# Patient Record
(demographics unavailable — no encounter records)

---

## 2025-01-30 NOTE — HEALTH RISK ASSESSMENT
[Good] : ~his/her~  mood as  good [Yes] : Yes [Monthly or less (1 pt)] : Monthly or less (1 point) [1 or 2 (0 pts)] : 1 or 2 (0 points) [Never (0 pts)] : Never (0 points) [No] : In the past 12 months have you used drugs other than those required for medical reasons? No [Current] : Current [5-9] : 5-9 [Several Days (1)] : 4.) Feeling tired or having little energy? Several days [1/2 of Days or More (2)] : 5.) Poor appetite or overeating? Half the days or more [Not at All (0)] : 9.) Thoughts that you would be off dead or of hurting yourself in some way? Not at all [Mild] : Severity of Depression is Mild [Not at all] : How difficult have these problems made it for you to do your work, take care of things at home, or get along with people? Not at all [PHQ-9 Positive] : PHQ-9 Positive [I have developed a follow-up plan documented below in the note.] : I have developed a follow-up plan documented below in the note. [[id number]] : 5 [Change in mental status noted] : No change in mental status noted

## 2025-01-30 NOTE — ASSESSMENT
[FreeTextEntry1] : depression: Controlled without medication. Denies SI. Gout: Check uric acid. Referred to [facility] rheum to discuss starting allopurinol due to multiple attacks within past 2 months.  ED: Check PSA, UA, testosterone. Discuss cialis 10mg daily PRN.  HLD: Check lipids. EKG shows sinus bradycardia.  Hcm: Due for colonoscopy 2027. Recommended 2nd shingrix when it is covered. Check labs.

## 2025-01-30 NOTE — REVIEW OF SYSTEMS
[Fever] : no fever [Chills] : no chills [Night Sweats] : no night sweats [Discharge] : no discharge [Vision Problems] : no vision problems [Itching] : no itching [Earache] : no earache [Nasal Discharge] : no nasal discharge [Sore Throat] : no sore throat [Chest Pain] : no chest pain [Palpitations] : no palpitations [Lower Ext Edema] : no lower extremity edema [Shortness Of Breath] : no shortness of breath [Wheezing] : no wheezing [Cough] : no cough [Dyspnea on Exertion] : not dyspnea on exertion [Abdominal Pain] : no abdominal pain [Nausea] : no nausea [Constipation] : no constipation [Diarrhea] : no diarrhea [Vomiting] : no vomiting [Melena] : no melena [Dysuria] : no dysuria [Joint Pain] : joint pain [Muscle Pain] : no muscle pain [Muscle Weakness] : no muscle weakness [Skin Rash] : no skin rash [Headache] : no headache [Dizziness] : no dizziness [Suicidal] : not suicidal [Anxiety] : no anxiety [Depression] : no depression [Easy Bleeding] : no easy bleeding [Easy Bruising] : no easy bruising [Swollen Glands] : no swollen glands

## 2025-01-30 NOTE — HISTORY OF PRESENT ILLNESS
[FreeTextEntry1] : cpe [de-identified] : 55M presents CPE and for erectile dysfunction. Reports that he doesn't have problems with initiating erection but has problems with maintaining it. Reports rare nocturnal tumescence. Has tried cialis which helps.

## 2025-03-03 NOTE — HISTORY OF PRESENT ILLNESS
[FreeTextEntry1] : 55M with HLD on atorastatin, here for evaluation of suspected gout.  symptoms started 8/2024 with R 1st MTP and R. knee pain.  has cut down on beers to 1-2 drinks/week. Infrequent shellfish use, 1x/month. Red meat 2-3 times/week. [place].  Most recent flareup was in 12/2024 affecting 1st MTP and 3rd MTP- red, hot, swollen.   Denies joint stiffness or morning stiffness.  occasional knee pain.  no shoulder, hand or wrist pain.  mild elbow pain b/l which he thinks is from lifting weights. no kidney stones. no rashes. no oral or nasal ulcers. no dry eyes or dry mouth. no weight loss. no hematuria. no Raynauds.  Family hx: mom had MS, daughter has vitiligo [Weight Loss] : no weight loss [Malar Facial Rash] : no malar facial rash [Skin Lesions] : no lesions [Skin Nodules] : no skin nodules [Oral Ulcers] : no oral ulcers [Dry Mouth] : no dry mouth [Arthralgias] : arthralgias [Joint Swelling] : no joint swelling [Dry Eyes] : no dry eyes

## 2025-03-03 NOTE — PHYSICAL EXAM
[Musculoskeletal - Swelling] : no joint swelling seen [Skin Lesions] : no skin lesions [General Appearance - Alert] : alert [General Appearance - In No Acute Distress] : in no acute distress [General Appearance - Well Developed] : well developed [General Appearance - Well-Appearing] : healthy appearing [Sclera] : the sclera and conjunctiva were normal [Extraocular Movements] : extraocular movements were intact [Outer Ear] : the ears and nose were normal in appearance [Neck Appearance] : the appearance of the neck was normal [] : no respiratory distress [Exaggerated Use Of Accessory Muscles For Inspiration] : no accessory muscle use [Edema] : there was no peripheral edema [FreeTextEntry1] : no warmth, erythema or tenderness in MTP or other joints of the feet [No Focal Deficits] : no focal deficits [Oriented To Time, Place, And Person] : oriented to person, place, and time [Affect] : the affect was normal [Mood] : the mood was normal

## 2025-04-09 NOTE — HISTORY OF PRESENT ILLNESS
[___ Week(s) Ago] : [unfilled] week(s) ago [FreeTextEntry1] : 4/9/25: he has been taking allopurinol 100 mg QD and colchicine QD without interval flareup. He has also made changes to his diet, still eating red meat though little; and cutting down on other purine rich foods. No joint pain at this time.

## 2025-04-09 NOTE — ASSESSMENT
[FreeTextEntry1] : 55M with HLD on atorastatin, here for followup of joint pains which started in 8/2024, suspected gout. Currently asymptomatic, having recently started urate lowering therapy (allopurinol 100 mg QD).    There is concern for gout based on history (2+ flareups this past year, and frequent beer consumption). Autoimmune testing was unremarkable as well in 3/2025 (results scanned in). patient advised to continue limiting intake of purine rich foods including alcohol, red meat, shellfish, turkey, high fructose corn syrup beverages. Continue allopurinol 100 mg QD and colchicine 0.6 mg QD for prophylaxis until uric acid reaches goal of <6 for at least 3 months. Check repeat uric acid level today- if continues to be <6, he can discontinue colchicine in 3 months and follow up at that time.

## 2025-04-09 NOTE — PHYSICAL EXAM
[General Appearance - Alert] : alert [General Appearance - In No Acute Distress] : in no acute distress [General Appearance - Well Developed] : well developed [General Appearance - Well-Appearing] : healthy appearing [Sclera] : the sclera and conjunctiva were normal [Extraocular Movements] : extraocular movements were intact [Outer Ear] : the ears and nose were normal in appearance [Neck Appearance] : the appearance of the neck was normal [Exaggerated Use Of Accessory Muscles For Inspiration] : no accessory muscle use [Edema] : there was no peripheral edema [Musculoskeletal - Swelling] : no joint swelling seen [] : no rash [Skin Lesions] : no skin lesions [No Focal Deficits] : no focal deficits [Oriented To Time, Place, And Person] : oriented to person, place, and time [Affect] : the affect was normal [Mood] : the mood was normal [FreeTextEntry1] : no warmth, erythema or tenderness in MTP or other joints of the feet

## 2025-04-14 NOTE — IMAGING
[de-identified] : LEFT ELBOW skin intact. no swelling. no TTP. elbow ROM: good extension, flexion. good pronation, supination. wrist ROM: good extension, flexion. good digital extension, flex to full fist. sensation intact to light touch. palpable radial pulse. + pain with resisted elbow flexion > resisted supination.  @4/14/25 XRAYS OF LEFT ELBOW (3 views - AP, OBLIQUE, AND LATERAL VIEWS): no acute displaced fracture or dislocation.   RIGHT ELBOW skin intact. no swelling. no TTP. elbow ROM: good extension, flexion. good pronation, supination. wrist ROM: good extension, flexion. good digital extension, flex to full fist. sensation intact to light touch. palpable radial pulse. + min pain with resisted elbow flexion. no pain with resisted supination.  @4/14/25 XRAYS OF RIGHT ELBOW (3 views - PA, OBLIQUE, AND LATERAL VIEWS): no acute displaced fracture or dislocation.

## 2025-04-14 NOTE — HISTORY OF PRESENT ILLNESS
[de-identified] : 4/14/25: 57yo male ([facility]. Retired) presents with wife for RIGHT > LEFT anterior elbow pain since October 2024. Denies injury, but pain started while working out (biceps curls). Reports that he was working out 1-2 hours a day. Pain improved after stopping workouts in November 2024, but he continues to have pain with lifting (eg suitcase) and sometimes twisting. Not currently taking any medication for this.  Hx: HLD. Gout - on Allopurinol. [FreeTextEntry5] : SUSAN ruiz [[facility]] 56 year old male is here today for evaluation of RIGHT > LEFT elbow pain after working out 6 months ago. pain subsided slightly after stopping working out but is present when lifting objects. denies radiating pain.

## 2025-04-14 NOTE — ASSESSMENT
[FreeTextEntry1] : The condition was explained to the patient. - recommend Reparel elbow sleeve PRN comfort. - prescribed PT for bilateral elbows. - pain guided activity modification. - OTC pain medication, ice PRN pain.  F/u 6 weeks.